# Patient Record
Sex: MALE | Race: WHITE | NOT HISPANIC OR LATINO | URBAN - METROPOLITAN AREA
[De-identification: names, ages, dates, MRNs, and addresses within clinical notes are randomized per-mention and may not be internally consistent; named-entity substitution may affect disease eponyms.]

---

## 2022-11-26 ENCOUNTER — HOSPITAL ENCOUNTER (EMERGENCY)
Facility: HOSPITAL | Age: 52
Discharge: LEFT BEFORE DISCHARGE | End: 2022-11-26
Attending: EMERGENCY MEDICINE

## 2022-11-26 ENCOUNTER — APPOINTMENT (EMERGENCY)
Dept: RADIOLOGY | Facility: HOSPITAL | Age: 52
End: 2022-11-26

## 2022-11-26 VITALS
RESPIRATION RATE: 20 BRPM | HEIGHT: 70 IN | HEART RATE: 70 BPM | DIASTOLIC BLOOD PRESSURE: 58 MMHG | TEMPERATURE: 98.8 F | SYSTOLIC BLOOD PRESSURE: 117 MMHG | OXYGEN SATURATION: 98 % | BODY MASS INDEX: 32.93 KG/M2 | WEIGHT: 230 LBS

## 2022-11-26 DIAGNOSIS — I95.9 HYPOTENSION: Primary | ICD-10-CM

## 2022-11-26 LAB
ATRIAL RATE: 64 BPM
P AXIS: 53 DEGREES
PR INTERVAL: 162 MS
QRS AXIS: 236 DEGREES
QRSD INTERVAL: 138 MS
QT INTERVAL: 448 MS
QTC INTERVAL: 462 MS
T WAVE AXIS: 33 DEGREES
VENTRICULAR RATE: 64 BPM

## 2022-11-26 RX ORDER — ONDANSETRON 2 MG/ML
4 INJECTION INTRAMUSCULAR; INTRAVENOUS ONCE
Status: DISCONTINUED | OUTPATIENT
Start: 2022-11-26 | End: 2022-11-26

## 2022-11-26 NOTE — ED NOTES
Pt made aware of most recent BP of 85/50  Pt educated by RN the benefit of IV and bloodwork  Pt still refusing  Resident & attending made aware  Resident came to assess pt        Rosalina Robb  11/26/22 0351

## 2022-11-26 NOTE — DISCHARGE INSTRUCTIONS
You have been given contact information for [address] and encouraged to establish care there for continuing hypotension  You have also been given the contact information for [facility] cardiology and encouraged to contact them to make an appointment as soon as possible in regards drink continued hypotension with intermittent bradycardia and a right bundle branch block found on EKG  You have been encouraged to remain in the emergency department receive a full cardiac workup but you have refused  Should you develop chest pain, shortness of breath, syncope, lightheadedness or any other symptoms you find concerning please return to the emergency department immediately

## 2022-11-26 NOTE — ED PROVIDER NOTES
History  Chief Complaint   Patient presents with   • Syncope     Pt got up to use the bathroom when his wife found him passed out  EMS arrived & pt was hypotensive & bradycardic  Pt has no complaints at this time  The patient is a 79-year-old male with no past medical history presents to the emergency department with complaint of hypotension and syncope  The patient's wife reports that earlier tonight the patient lost consciousness on 2 separate occasions so she called 911  EMS reported that he was hypotensive with systolic in the 63F upon arrival with intermittent bradycardia with a heart rate occasionally dropping into the 40s  The patient initially refused transport but after discussing his situation with on-call medical command he elected to be transported to the emergency department  Upon arrival to the emergency department the patient stated that he was “feeling perfectly fine”  The patient denies lightheadedness, headache, chest pain, shortness of breath, abdominal pain, nausea, vomiting, dysuria, hematuria, diarrhea, rash or fever  None       History reviewed  No pertinent past medical history  History reviewed  No pertinent surgical history  History reviewed  No pertinent family history  I have reviewed and agree with the history as documented  E-Cigarette/Vaping   • E-Cigarette Use Current Every Day User      E-Cigarette/Vaping Substances   • Nicotine Yes      Social History     Tobacco Use   • Smoking status: Former     Types: Cigarettes   • Smokeless tobacco: Never   Vaping Use   • Vaping Use: Every day   • Substances: Nicotine   Substance Use Topics   • Alcohol use: Yes     Alcohol/week: 12 0 standard drinks     Types: 12 Cans of beer per week   • Drug use: Never        Review of Systems   Constitutional: Negative for chills, fatigue and fever  HENT: Negative for congestion, ear pain and sore throat  Eyes: Negative for photophobia, pain and visual disturbance  Respiratory: Negative for cough, shortness of breath and stridor  Cardiovascular: Positive for palpitations  Negative for chest pain and leg swelling  Gastrointestinal: Negative for abdominal distention, abdominal pain, diarrhea, nausea and vomiting  Endocrine: Negative  Genitourinary: Negative for dysuria and hematuria  Musculoskeletal: Negative for arthralgias, back pain, neck pain and neck stiffness  Skin: Negative for color change and rash  Allergic/Immunologic: Negative  Neurological: Positive for syncope  Negative for seizures, weakness, light-headedness, numbness and headaches  Hematological: Negative  Psychiatric/Behavioral: Negative  All other systems reviewed and are negative  Physical Exam  ED Triage Vitals   Temperature Pulse Respirations Blood Pressure SpO2   11/26/22 0224 11/26/22 0219 11/26/22 0219 11/26/22 0219 11/26/22 0219   98 8 °F (37 1 °C) 68 20 103/71 98 %      Temp Source Heart Rate Source Patient Position - Orthostatic VS BP Location FiO2 (%)   11/26/22 0224 11/26/22 0219 11/26/22 0219 11/26/22 0219 --   Oral Monitor Sitting Right arm       Pain Score       11/26/22 0219       No Pain             Orthostatic Vital Signs  Vitals:    11/26/22 0219 11/26/22 0230 11/26/22 0300 11/26/22 0315   BP: 103/71 92/55 (!) 85/50 (!) 89/54   Pulse: 68 62 62 68   Patient Position - Orthostatic VS: Sitting          Physical Exam  Vitals and nursing note reviewed  Constitutional:       General: He is not in acute distress  Appearance: Normal appearance  He is well-developed and normal weight  He is not ill-appearing or diaphoretic  HENT:      Head: Normocephalic and atraumatic  Nose: Nose normal       Mouth/Throat:      Mouth: Mucous membranes are moist       Pharynx: Oropharynx is clear  Eyes:      Extraocular Movements: Extraocular movements intact  Conjunctiva/sclera: Conjunctivae normal       Pupils: Pupils are equal, round, and reactive to light  Cardiovascular:      Rate and Rhythm: Normal rate and regular rhythm  Pulses: Normal pulses  Heart sounds: Normal heart sounds  No murmur heard  No friction rub  Pulmonary:      Effort: Pulmonary effort is normal  No respiratory distress  Breath sounds: Normal breath sounds  No stridor  No wheezing, rhonchi or rales  Abdominal:      General: Abdomen is flat  Bowel sounds are normal  There is no distension  Palpations: Abdomen is soft  Tenderness: There is no abdominal tenderness  There is no guarding or rebound  Musculoskeletal:         General: No swelling or tenderness  Normal range of motion  Cervical back: Normal range of motion and neck supple  No rigidity or tenderness  Right lower leg: No edema  Left lower leg: No edema  Lymphadenopathy:      Cervical: No cervical adenopathy  Skin:     General: Skin is warm and dry  Capillary Refill: Capillary refill takes less than 2 seconds  Findings: No bruising, erythema or rash  Neurological:      General: No focal deficit present  Mental Status: He is alert and oriented to person, place, and time  Mental status is at baseline  Cranial Nerves: No cranial nerve deficit  Sensory: No sensory deficit  Motor: No weakness  Psychiatric:         Mood and Affect: Mood normal          ED Medications  Medications - No data to display    Diagnostic Studies  Results Reviewed     None                 XR chest 1 view portable   ED Interpretation by Negar Henley DO (11/26 0520)   No acute cardiopulmonary disease              Procedures  ECG 12 Lead Documentation Only    Date/Time: 11/26/2022 2:48 AM  Performed by: Negar Henley DO  Authorized by: Negar Henley DO     Indications / Diagnosis:  Bradycardia  ECG reviewed by me, the ED Provider: yes    Patient location:  ED  Previous ECG:     Previous ECG:  Unavailable    Comparison to cardiac monitor: No    Interpretation: Interpretation: abnormal    Quality:     Tracing quality:  Limited by artifact  Rate:     ECG rate:  64    ECG rate assessment: bradycardic    Rhythm:     Rhythm: sinus bradycardia    Ectopy:     Ectopy: none    QRS:     QRS axis:  Normal    QRS intervals: Wide  Conduction:     Conduction: abnormal      Abnormal conduction: complete RBBB    ST segments:     ST segments:  Normal  T waves:     T waves: inverted      Inverted:  III and V4  Comments:      Sinus bradycardia with complete right bundle branch block  The patient denies chest pain or shortness of breath and refuses blood draw for troponin  He has requested to check out AMA  ED Course  ED Course as of 11/26/22 0358   Sat Nov 26, 2022   0340 XR chest 1 view portable  No acute cardiopulmonary disease  0345 ECG 12 lead  Normal sinus rhythm with right bundle-branch block  Patient denies chest pain or shortness of breath  He refuses IV access for labs or IV fluids  8751 Patient is refusing further workup beyond chest x-ray and ECG  He will be discharged AMA but encouraged to follow-up with cardiology as an outpatient  Strict return precautions will be discussed  Further instructions per discharge orders  MDM  Number of Diagnoses or Management Options  Hypotension  Diagnosis management comments: The patient is a 73-year-old male with no past medical history presents to the emergency department with complaint of hypotension and syncope  Upon initial presentation the patient was alert and oriented x4 in no acute distress  He remained hypotensive with a systolic in the 96K but the patient refused IV access including IV fluids or lab work  He would only agree to an EKG and a chest x-ray  He stated that he would follow up with a cardiologist and would give blood work at that time [employer] his conditions”  He has elected to check out AMA at this time    Risks of that decision were discussed with him and strict return precautions were given  Further instructions per discharge orders  Disposition  Final diagnoses:   Hypotension     Time reflects when diagnosis was documented in both MDM as applicable and the Disposition within this note     Time User Action Codes Description Comment    11/26/2022  3:50 AM Jatinder Chirinos [I95 9] Hypotension       ED Disposition     ED Disposition   AMA    Condition   --    Date/Time   Sat Nov 26, 2022  3:49 AM    Comment   Date: 11/26/2022  Patient: Anahi De León  Admitted: 11/26/2022  2:15 AM  Attending Provider: No att  providers found    Anahi De León or his authorized caregiver has made the decision for the patient to leave the emergency department against the advice o f his attending physician  He or his authorized caregiver has been informed and understands the inherent risks, including death, Myocardial injury, Heart Failure, respiratory failure or cardiogenic shock  He or his authorized caregiver has decided t o accept the responsibility for this decision  Anahi De León and all necessary parties have been advised that he may return for further evaluation or treatment  His condition at time of discharge was unknown  Anahi De León had current vital signs as fol lows:  BP (!) 85/50   Pulse 62   Temp 98 8 °F (37 1 °C) (Oral)   Resp 20   Ht 5' 10" (1 778 m)   Wt 104 kg (230 lb)            Follow-up Information     Follow up With Specialties Details Why Contact Info Additional Information    [facility] Schedule an appointment as soon as possible for a visit  For hypotension  [facility]  [facility] [id number]  [id number]  , [place], [place], [address] [facility] Schedule an appointment as soon as possible for a visit  For right bundle-branch block of unknown origin and intermittent bradycardia   [phone] [address] Cardiology [address] [facility], [place], [place], [facility]    [facility] [address] Emergency Department Emergency Medicine  As needed [address] [facility] [address] [address] Emergency Department,  [address], [place], [place], [address]          Patient's Medications    No medications on file     No discharge procedures on file  PDMP Review     None           ED Provider  Attending physically available and evaluated Naila Abdul I managed the patient along with the ED Attending      Electronically Signed by         Toshia Flaherty DO  11/26/22 3376

## 2022-11-26 NOTE — ED ATTENDING ATTESTATION
11/26/2022  I, Vesta Diallo DO, saw and evaluated the patient  I have discussed the patient with the resident/non-physician practitioner and agree with the resident's/non-physician practitioner's findings, Plan of Care, and MDM as documented in the resident's/non-physician practitioner's note, except where noted  All available labs and Radiology studies were reviewed  I was present for key portions of any procedure(s) performed by the resident/non-physician practitioner and I was immediately available to provide assistance  At this point I agree with the current assessment done in the Emergency Department  I have conducted an independent evaluation of this patient a history and physical is as follows:    Patient is a 62-year-old male with no known past medical history who presents with lightheadedness and near syncope  Patient states that he got up from bed and ambulated to the restroom  He had lightheadedness but did not lose consciousness  He denies any associated chest pain, shortness of breath, diaphoresis or other complaints  Family called EMS who arrived and checked vital signs  He was found to be bradycardic and hypotensive  He initially refused transport, but eventually came in for evaluation  Patient denies any complaints at this time  He has no lightheadedness or other concerns  He has had low blood pressure in the emergency department, but it was 117/58 at time of discharge  His heart rate has been normal   Recommended additional workup, which patient refused  On exam, patient is in no acute distress  Heart is regular rate and rhythm  Breath sounds normal   Abdomen is soft, nontender, nondistended  No rebound or guarding  No lower extremity edema or lower extremity pain  EKG reveals normal sinus rhythm with a right bundle branch block  Chest x-ray is unremarkable  Patient refused additional workup and is requesting to leave AMA    He understands the risks of leaving against medical advice, including but not limited to syncope, dysrhythmia, heart attack, death  He is able to reiterate these concerns and continues to express a desire to leave the ED  He does agree to follow-up as an outpatient and return if symptoms recur  Patient is well-appearing and stable at discharge  Portions of the above record have been created with voice recognition software  Occasional wrong word or "sound alike" substitutions may have occurred due to the inherent limitations of voice recognition software  Read the chart carefully and recognize, using context, where substitutions may have occurred        ED Course         Critical Care Time  Procedures